# Patient Record
Sex: MALE | Race: BLACK OR AFRICAN AMERICAN | NOT HISPANIC OR LATINO | URBAN - METROPOLITAN AREA
[De-identification: names, ages, dates, MRNs, and addresses within clinical notes are randomized per-mention and may not be internally consistent; named-entity substitution may affect disease eponyms.]

---
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
## 2025-03-13 NOTE — ED PROVIDER NOTE - ED STEMI HIDDEN
PAST MEDICAL HISTORY:  Anemia     Diabetes mellitus type II     DM2 (diabetes mellitus, type 2)     H/O: HTN (hypertension)     Headache     HTN (hypertension), benign     Hypercholesterolemia     Radius fracture left    Vertigo      hide